# Patient Record
Sex: MALE | URBAN - METROPOLITAN AREA
[De-identification: names, ages, dates, MRNs, and addresses within clinical notes are randomized per-mention and may not be internally consistent; named-entity substitution may affect disease eponyms.]

---

## 2021-07-07 ENCOUNTER — APPOINTMENT (RX ONLY)
Dept: URBAN - METROPOLITAN AREA CLINIC 116 | Facility: CLINIC | Age: 36
Setting detail: DERMATOLOGY
End: 2021-07-07

## 2021-07-07 DIAGNOSIS — L72.8 OTHER FOLLICULAR CYSTS OF THE SKIN AND SUBCUTANEOUS TISSUE: ICD-10-CM

## 2021-07-07 DIAGNOSIS — Z00.8 ENCOUNTER FOR OTHER GENERAL EXAMINATION: ICD-10-CM

## 2021-07-07 DIAGNOSIS — D22 MELANOCYTIC NEVI: ICD-10-CM

## 2021-07-07 PROBLEM — D22.5 MELANOCYTIC NEVI OF TRUNK: Status: ACTIVE | Noted: 2021-07-07

## 2021-07-07 PROBLEM — D22.61 MELANOCYTIC NEVI OF RIGHT UPPER LIMB, INCLUDING SHOULDER: Status: ACTIVE | Noted: 2021-07-07

## 2021-07-07 PROCEDURE — 99202 OFFICE O/P NEW SF 15 MIN: CPT | Mod: 25

## 2021-07-07 PROCEDURE — 99072 ADDL SUPL MATRL&STAF TM PHE: CPT

## 2021-07-07 PROCEDURE — ? COUNSELING

## 2021-07-07 PROCEDURE — ? PHE RELATED SUPPLIES PROVIDED/DISPENSED

## 2021-07-07 PROCEDURE — 10060 I&D ABSCESS SIMPLE/SINGLE: CPT

## 2021-07-07 PROCEDURE — ? INCISION AND DRAINAGE

## 2021-07-07 ASSESSMENT — LOCATION SIMPLE DESCRIPTION DERM
LOCATION SIMPLE: RIGHT SHOULDER
LOCATION SIMPLE: RIGHT UPPER BACK
LOCATION SIMPLE: LEFT UPPER BACK

## 2021-07-07 ASSESSMENT — LOCATION ZONE DERM
LOCATION ZONE: TRUNK
LOCATION ZONE: ARM

## 2021-07-07 ASSESSMENT — LOCATION DETAILED DESCRIPTION DERM
LOCATION DETAILED: RIGHT POSTERIOR SHOULDER
LOCATION DETAILED: RIGHT LATERAL UPPER BACK
LOCATION DETAILED: LEFT SUPERIOR UPPER BACK
LOCATION DETAILED: LEFT MID-UPPER BACK
LOCATION DETAILED: RIGHT SUPERIOR UPPER BACK

## 2021-07-07 NOTE — PROCEDURE: INCISION AND DRAINAGE
Curette: No
Preparation Text: The area was prepped in the usual clean fashion.
Dressing: dry sterile dressing
Detail Level: Detailed
Anesthesia Type: 1% lidocaine with epinephrine
Suture Text: The incision was partially closed with
Post-Care Instructions: I reviewed with the patient in detail post-care instructions. Patient should keep wound covered and call the office should any redness, pain, swelling or worsening occur.
Drainage Amount?: minimal
Size Of Lesion In Cm (Optional But May Be Required For Some Insurances): 1
Epidermal Sutures: 4-0 Ethilon
Method: 11 blade
Wound Care: Petrolatum
Epidermal Closure: simple interrupted
Consent was obtained and risks were reviewed including but not limited to delayed wound healing, infection, need for multiple I and D's, and pain.
Curette Text (Optional): After the contents were expressed a curette was used to partially remove the cyst wall.
Lesion Type: Abscess
Drainage Type?: bloody and cyst-like

## 2022-12-27 ENCOUNTER — APPOINTMENT (RX ONLY)
Dept: URBAN - METROPOLITAN AREA CLINIC 116 | Facility: CLINIC | Age: 37
Setting detail: DERMATOLOGY
End: 2022-12-27

## 2022-12-27 DIAGNOSIS — L98.8 OTHER SPECIFIED DISORDERS OF THE SKIN AND SUBCUTANEOUS TISSUE: ICD-10-CM

## 2022-12-27 PROCEDURE — ? BOTOX (U OR CC)

## 2022-12-27 PROCEDURE — ? INVENTORY

## 2022-12-27 NOTE — PROCEDURE: BOTOX (U OR CC)
Additional Area 3 Units: 0
Lot #: [id number]
Detail Level: Detailed
Periorbital Skin Units/Cc: 12
Price (Use Numbers Only, No Special Characters Or $): 267.51
Dilution (U/0.1 Cc): 4
Document As Units Or Cc?: units
Post-Care Instructions: Patient instructed to not lie down for 4 hours and limit physical activity for 24 hours.
Consent: Written consent obtained. Risks include but not limited to lid/brow ptosis, bruising, swelling, diplopia, temporary effect, incomplete chemical denervation.
Including Pricing Information In The Note: No
Expiration Date (Month Year): 2025/02
Glabellar Complex Units: 15

## 2023-11-28 ENCOUNTER — APPOINTMENT (RX ONLY)
Dept: URBAN - METROPOLITAN AREA CLINIC 116 | Facility: CLINIC | Age: 38
Setting detail: DERMATOLOGY
End: 2023-11-28

## 2023-11-28 DIAGNOSIS — L98.8 OTHER SPECIFIED DISORDERS OF THE SKIN AND SUBCUTANEOUS TISSUE: ICD-10-CM

## 2023-11-28 PROCEDURE — ? INVENTORY

## 2023-11-28 PROCEDURE — ? BOTOX (U OR CC)

## 2023-11-28 NOTE — PROCEDURE: BOTOX (U OR CC)
Additional Area 3 Units: 0
Lot #: [id number]
Detail Level: Detailed
Detail Level: Detailed
Periorbital Skin Units/Cc: 12
Dilution (U/0.1 Cc): 4
Document As Units Or Cc?: units
Forehead Units/Cc: 16
Post-Care Instructions: Patient instructed to not lie down for 4 hours and limit physical activity for 24 hours.
Consent: Written consent obtained. Risks include but not limited to lid/brow ptosis, bruising, swelling, diplopia, temporary effect, incomplete chemical denervation.
Including Pricing Information In The Note: No
Expiration Date (Month Year): 2025/02
Glabellar Complex Units: 15

## 2024-04-30 ENCOUNTER — APPOINTMENT (RX ONLY)
Dept: URBAN - METROPOLITAN AREA CLINIC 116 | Facility: CLINIC | Age: 39
Setting detail: DERMATOLOGY
End: 2024-04-30

## 2024-04-30 DIAGNOSIS — L98.8 OTHER SPECIFIED DISORDERS OF THE SKIN AND SUBCUTANEOUS TISSUE: ICD-10-CM

## 2024-04-30 PROCEDURE — ? INVENTORY

## 2024-04-30 PROCEDURE — ? BOTOX (U OR CC)

## 2024-04-30 NOTE — PROCEDURE: BOTOX (U OR CC)
Additional Area 3 Units: 0
Detail Level: Detailed
Periorbital Skin Units/Cc: 12
Dilution (U/0.1 Cc): 1
Document As Units Or Cc?: units
Forehead Units/Cc: 16
Post-Care Instructions: Patient instructed to not lie down for 4 hours and limit physical activity for 24 hours.
Consent: Written consent obtained. Risks include but not limited to lid/brow ptosis, bruising, swelling, diplopia, temporary effect, incomplete chemical denervation.
Including Pricing Information In The Note: No
Glabellar Complex Units: 15

## 2024-12-17 ENCOUNTER — APPOINTMENT (OUTPATIENT)
Dept: URBAN - METROPOLITAN AREA CLINIC 116 | Facility: CLINIC | Age: 39
Setting detail: DERMATOLOGY
End: 2024-12-17

## 2024-12-17 DIAGNOSIS — L98.8 OTHER SPECIFIED DISORDERS OF THE SKIN AND SUBCUTANEOUS TISSUE: ICD-10-CM

## 2024-12-17 PROCEDURE — ? INVENTORY

## 2024-12-17 PROCEDURE — ? BOTOX (U OR CC)

## 2024-12-17 PROCEDURE — ? COUNSELING

## 2025-07-22 ENCOUNTER — APPOINTMENT (OUTPATIENT)
Dept: URBAN - METROPOLITAN AREA CLINIC 116 | Facility: CLINIC | Age: 40
Setting detail: DERMATOLOGY
End: 2025-07-22

## 2025-07-22 DIAGNOSIS — L98.8 OTHER SPECIFIED DISORDERS OF THE SKIN AND SUBCUTANEOUS TISSUE: ICD-10-CM

## 2025-07-22 PROCEDURE — ? BOTOX (U OR CC)

## 2025-07-22 PROCEDURE — ? INVENTORY

## 2025-07-22 PROCEDURE — ? COUNSELING

## 2025-07-22 NOTE — PROCEDURE: BOTOX (U OR CC)
Additional Area 3 Units: 0
Lot #: [id number]
Detail Level: Detailed
Periorbital Skin Units/Cc: 12
Dilution (U/0.1 Cc): 1
Document As Units Or Cc?: units
Forehead Units/Cc: 16
Post-Care Instructions: Patient instructed to not lie down for 4 hours and limit physical activity for 24 hours.
Consent: Written consent obtained. Risks include but not limited to lid/brow ptosis, bruising, swelling, diplopia, temporary effect, incomplete chemical denervation.
Including Pricing Information In The Note: No
Expiration Date (Month Year): 11/2026
Glabellar Complex Units: 15